# Patient Record
Sex: MALE | Race: WHITE | Employment: UNEMPLOYED | URBAN - METROPOLITAN AREA
[De-identification: names, ages, dates, MRNs, and addresses within clinical notes are randomized per-mention and may not be internally consistent; named-entity substitution may affect disease eponyms.]

---

## 2017-04-18 ENCOUNTER — APPOINTMENT (OUTPATIENT)
Dept: ULTRASOUND IMAGING | Facility: CLINIC | Age: 52
End: 2017-04-18
Attending: PHYSICIAN ASSISTANT

## 2017-04-18 ENCOUNTER — HOSPITAL ENCOUNTER (EMERGENCY)
Facility: CLINIC | Age: 52
Discharge: HOME | End: 2017-04-18
Attending: PHYSICIAN ASSISTANT | Admitting: PHYSICIAN ASSISTANT

## 2017-04-18 ENCOUNTER — APPOINTMENT (OUTPATIENT)
Dept: GENERAL RADIOLOGY | Facility: CLINIC | Age: 52
End: 2017-04-18
Attending: PHYSICIAN ASSISTANT

## 2017-04-18 VITALS
DIASTOLIC BLOOD PRESSURE: 98 MMHG | WEIGHT: 315 LBS | SYSTOLIC BLOOD PRESSURE: 152 MMHG | TEMPERATURE: 97.3 F | RESPIRATION RATE: 16 BRPM | HEART RATE: 82 BPM

## 2017-04-18 DIAGNOSIS — M79.605 PAIN OF LEFT LOWER EXTREMITY DUE TO INJURY: ICD-10-CM

## 2017-04-18 PROCEDURE — 99284 EMERGENCY DEPT VISIT MOD MDM: CPT | Performed by: PHYSICIAN ASSISTANT

## 2017-04-18 PROCEDURE — 73630 X-RAY EXAM OF FOOT: CPT | Mod: LT

## 2017-04-18 PROCEDURE — 93971 EXTREMITY STUDY: CPT | Mod: LT

## 2017-04-18 PROCEDURE — 73590 X-RAY EXAM OF LOWER LEG: CPT | Mod: LT

## 2017-04-18 PROCEDURE — 99285 EMERGENCY DEPT VISIT HI MDM: CPT | Mod: 25

## 2017-04-18 RX ORDER — CEPHALEXIN 500 MG/1
500 CAPSULE ORAL 4 TIMES DAILY
Qty: 28 CAPSULE | Refills: 0 | Status: SHIPPED | OUTPATIENT
Start: 2017-04-18 | End: 2017-04-25

## 2017-04-18 NOTE — ED PROVIDER NOTES
History     Chief Complaint   Patient presents with     Fall     rt lower leg pain after fall 6 days ago     HPI  Ozzie Mathew is a 52 year old male who presents to the emergency department with concerns over left lower leg pain after injury 7 days prior to arrival.  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 He was at a job site entering the house when he slipped on a wooden board that was on the ground.  He is unsure exactly how he landed however states he did sustain an abrasion to his anterior proximal left shin.  He additionally noted left lower leg, swelling ecchymosis and some decreased sensation however denies any paresthesias or true sensation loss.  He also admits to some erythema in the leg once prompted.  He states that he was able to bear weight normally initially however as swelling has somewhat improved he has noted worsening of his pain especially when attempting to stand after long periods of rest.  He has attempted to treat with aspirin however infrequently.  No consistent over the counter treatments.  He denies any prior history of chronic left lower leg pain or significant trauma.  Patient does have a prior history of asthma however he denies any dyspnea, wheezing at this time.  He also denies any fevers, chills, generalized myalgias or other areas of pain as a result of fall besides left leg.      No past medical history on file.  Current Outpatient Prescriptions   Medication Sig Dispense Refill     cephALEXin (KEFLEX) 500 MG capsule Take 1 capsule (500 mg) by mouth 4 times daily for 7 days 28 capsule 0     Social History   Substance Use Topics     Smoking status: Not on file     Smokeless tobacco: Not on file     Alcohol use Not on file     I have reviewed the Medications, Allergies, Past Medical and Surgical History, and Social History in the Epic system.    Review of Systems  CONSTITUTIONAL:NEGATIVE for fever, chills, change in weight  INTEGUMENTARY/SKIN:  POSITIVE for ecchymosis, abrasion, erythema   RESP:NEGATIVE for significant cough or SOB  MUSCULOSKELETAL: POSITIVE  for left leg pain swelling and NEGATIVE for other significant arthralgias or myalgias   NEURO: POSITIVE for mildly decreased sensation to the left lower leg and NEGATIVE for other significant arthralgias or myalgias  Physical Exam   BP (!) 157/112  Temp 97.3  F (36.3  C) (Oral)  Resp 16  Wt (!) 149.7 kg (330 lb)  Physical Exam   Constitutional: He is oriented to person, place, and time. He appears well-developed and well-nourished. He appears distressed (mild patient appears uncomfortable).   Cardiovascular: Normal rate, regular rhythm and normal heart sounds.  Exam reveals no gallop and no friction rub.    No murmur heard.  Dorsalis pedis and posterior tibialis pulses are difficult to palpate bilaterally, however are present on doppler.     Pulmonary/Chest: Effort normal. No respiratory distress. He has wheezes (right lung base). He has no rales. He exhibits no tenderness.   Musculoskeletal:        Left hip: Normal.        Left knee: He exhibits ecchymosis. He exhibits normal range of motion, no swelling, no effusion, no LCL laxity and no MCL laxity. Tenderness found. MCL tenderness noted.        Left ankle: He exhibits swelling and ecchymosis. He exhibits normal range of motion, no deformity, no laceration and normal pulse. No tenderness. Achilles tendon normal.        Left upper leg: He exhibits tenderness. He exhibits no bony tenderness, no swelling, no edema, no deformity and no laceration.        Left lower leg: He exhibits tenderness, bony tenderness, swelling and edema. He exhibits no deformity and no laceration.        Left foot: There is tenderness and swelling. There is normal range of motion, normal capillary refill, no crepitus, no deformity and no laceration.   Neurological: He is alert and oriented to person, place, and time.   Sensation to light touch of left lower extremity is  grossly intact.     Skin: Skin is warm and dry. Abrasion (old proximal left lower leg, scabbed over well healing) and ecchymosis (diffuse of on left lower extermity exnding from mid medial thigh to to heel) noted. No laceration and no rash noted. There is erythema (anterior left lower leg with increased warmth to palpation ).     ED Course     ED Course     Procedures        Critical Care time:  none            Labs Ordered and Resulted from Time of ED Arrival Up to the Time of Departure from the ED - No data to display    Results for orders placed or performed during the hospital encounter of 04/18/17   Tib/Fib XR, left    Narrative    XR TIBIA & FIBULA LT 2 VW, XR FOOT LT G/E 3 VW  4/18/2017 12:28  PM    HISTORY:  pain and swelling of lower leg after a fall at a  construction site a week ago.    COMPARISON:  None.      Impression    IMPRESSION:      Tibia-fibula: No acute process. Vascular calcifications in the  anterior soft tissues.     Foot: No acute process. Small inferior calcaneal spur.    MARVA TUCKER MD   Foot XR, G/E 3 views, left    Narrative    XR TIBIA & FIBULA LT 2 VW, XR FOOT LT G/E 3 VW  4/18/2017 12:28  PM    HISTORY:  pain and swelling of lower leg after a fall at a  construction site a week ago.    COMPARISON:  None.      Impression    IMPRESSION:      Tibia-fibula: No acute process. Vascular calcifications in the  anterior soft tissues.     Foot: No acute process. Small inferior calcaneal spur.    MARVA TUCKER MD   US Lower Extremity Venous Duplex Left    Narrative    US LOWER EXTREMITY VENOUS DUPLEX LEFT 4/18/2017 12:12 PM    HISTORY: Trauma to calf region.    TECHNIQUE: Color flow and doppler spectral waveform analysis of deep  venous structures is performed.  Imaged deep venous structures of the  left lower extremity include the left common femoral vein, femoral  vein, popliteal vein, and visualized posterior deep calf veins.    COMPARISON: None.    FINDINGS: No DVT is demonstrated. There  is a thin elliptical shaped  collection in the proximal calf that measures 5.1 x 4.9 x 0.6 cm  thickness. This is deep to the area of patient's bruising on the skin.      Impression    IMPRESSION:  1. Negative left lower extremity venous Doppler for DVT.  2. Thin liquefied hematoma/seroma deep to the area bruising in calf..     BRANDON YOUNG MD     Assessments & Plan (with Medical Decision Making)     I have reviewed the nursing notes.    I have reviewed the findings, diagnosis, plan and need for follow up with the patient.    Discharge Medication List as of 4/18/2017 12:59 PM      START taking these medications    Details   cephALEXin (KEFLEX) 500 MG capsule Take 1 capsule (500 mg) by mouth 4 times daily for 7 days, Disp-28 capsule, R-0, Local Print           Final diagnoses:   Pain of left lower extremity due to injury     50-year-old male presents to the emergency department with concerns over left lower leg pain, swelling after sustaining injury 1 week ago when he slipped and fell while performing work for which he is self-employed.  He was noted to be hypertensive on arrival, remainder of vital signs within normal limits.  Incidentally a nurse did document that patient felt unsafe in his home however when I asked patient about this he laughed and denied any concerns.  Physical exam findings were significant for diffuse ecchymosis of the left lower extremity from the medial mid thigh to the heel.  There was significant edema of his foot and left lower leg to the proximal tibia.  Old well healing abrasion to the proximal anterior left lower leg and some erythema with increased warmth on the mid anterior leg.  As part of evaluation patient did have x-ray of the tib-fib and foot which were negative for acute fracture, dislocation.  Given degree of edema and erythema I also did elect to obtain an ultrasound of the lower which was negative for acute DVT.  There was a thin liquefied hematoma/seroma deep in the area of  bruising in the proximal calf per radiology report.  History and physical exam is significant for draining hematoma.  Given presence of erythema however I do have some concern for possible developing secondary infection/cellulitis.  Patient was therefore discharged home with the prescription for Keflex.  He was instructed to continue over-the-counter symptomatic treatment as needed with Tylenol/ibuprofen for pain.  Elevate the extremity.  Limited activity as tolerated by discomfort.  Follow-up with primary care provider if no improvement of symptoms within the next 5-7 days.  Worrisome reasons to return to the ER sooner discussed.    Disclaimer: This note consists of symbols derived from keyboarding, dictation, and/or voice recognition software. As a result, there may be errors in the script that have gone undetected.  Please consider this when interpreting information found in the chart.    4/18/2017   [facility] EMERGENCY DEPARTMENT     Radhika Soler PA-C  04/18/17 2128

## 2017-04-18 NOTE — ED AVS SNAPSHOT
[facility] Emergency Department    [address] [address]    Phone:  [phone]    Fax:  [phone]                                       Ozzie Mathew   MRN: [id number]    Department:  [facility] Emergency Department   Date of Visit:  4/18/2017           After Visit Summary Signature Page     I have received my discharge instructions, and my questions have been answered. I have discussed any challenges I see with this plan with the nurse or doctor.    ..........................................................................................................................................  Patient/Patient Representative Signature      ..........................................................................................................................................  Patient Representative Print Name and Relationship to Patient    ..................................................               ................................................  Date                                            Time    ..........................................................................................................................................  Reviewed by Signature/Title    ...................................................              ..............................................  Date                                                            Time

## 2017-04-18 NOTE — ED AVS SNAPSHOT
[facility] Emergency Department    [address] [address]    Phone:  [phone]    Fax:  [phone]                                       Ozzie Mathew   MRN: [id number]    Department:  [facility] Emergency Department   Date of Visit:  4/18/2017           Patient Information     Date Of Birth          1965        Your diagnoses for this visit were:     Pain of left lower extremity due to injury        You were seen by Radhika Soler PA-C.      Follow-up Information     Follow up with No Ref-Primary, Physician In 3 days.    Why:  if no improvement or sooner if new or worsening symptoms       24 Hour Appointment Hotline       To make an appointment at any [facility] clinic, call [phone] ([phone]). If you don't have a family doctor or clinic, we will help you find one. [facility] clinics are conveniently located to serve the needs of you and your family.             Review of your medicines      START taking        Dose / Directions Last dose taken    cephALEXin 500 MG capsule   Commonly known as:  KEFLEX   Dose:  500 mg   Quantity:  28 capsule        Take 1 capsule (500 mg) by mouth 4 times daily for 7 days   Refills:  0                Prescriptions were sent or printed at these locations (1 Prescription)                   Other Prescriptions                Printed at Department/Unit printer (1 of 1)         cephALEXin (KEFLEX) 500 MG capsule                Procedures and tests performed during your visit     Foot XR, G/E 3 views, left    Tib/Fib XR, left    US Lower Extremity Venous Duplex Left      Orders Needing Specimen Collection     None      Pending Results     No orders found from 4/16/2017 to 4/19/2017.            Pending Culture Results     No orders found from 4/16/2017 to 4/19/2017.            Test Results From Your Hospital Stay        4/18/2017 12:43 PM      Narrative     XR TIBIA & FIBULA LT 2 VW, XR FOOT LT G/E 3 VW  4/18/2017 12:28  PM    HISTORY:  pain  and swelling of lower leg after a fall at a  construction site a week ago.    COMPARISON:  None.        Impression     IMPRESSION:      Tibia-fibula: No acute process. Vascular calcifications in the  anterior soft tissues.     Foot: No acute process. Small inferior calcaneal spur.    MARVA TUCKER MD         4/18/2017 12:43 PM      Narrative     XR TIBIA & FIBULA LT 2 VW, XR FOOT LT G/E 3 VW  4/18/2017 12:28  PM    HISTORY:  pain and swelling of lower leg after a fall at a  construction site a week ago.    COMPARISON:  None.        Impression     IMPRESSION:      Tibia-fibula: No acute process. Vascular calcifications in the  anterior soft tissues.     Foot: No acute process. Small inferior calcaneal spur.    MARVA TUCKER MD         4/18/2017 12:16 PM      Narrative     US LOWER EXTREMITY VENOUS DUPLEX LEFT 4/18/2017 12:12 PM    HISTORY: Trauma to calf region.    TECHNIQUE: Color flow and doppler spectral waveform analysis of deep  venous structures is performed.  Imaged deep venous structures of the  left lower extremity include the left common femoral vein, femoral  vein, popliteal vein, and visualized posterior deep calf veins.    COMPARISON: None.    FINDINGS: No DVT is demonstrated. There is a thin elliptical shaped  collection in the proximal calf that measures 5.1 x 4.9 x 0.6 cm  thickness. This is deep to the area of patient's bruising on the skin.        Impression     IMPRESSION:  1. Negative left lower extremity venous Doppler for DVT.  2. Thin liquefied hematoma/seroma deep to the area bruising in calf..     BRANDON YOUNG MD                Thank you for choosing [facility]       Thank you for choosing [facility] for your care. Our goal is always to provide you with excellent care. Hearing back from our patients is one way we can continue to improve our services. Please take a few minutes to complete the written survey that you may receive in the mail after you visit with us. Thank you!        Cindy  "Information     [facility] lets you send messages to your doctor, view your test results, renew your prescriptions, schedule appointments and more. To sign up, go to [email or web address] . Click on \"Log in\" on the left side of the screen, which will take you to the Welcome page. Then click on \"Sign up Now\" on the right side of the page.     You will be asked to enter the access code listed below, as well as some personal information. Please follow the directions to create your username and password.     Your access code is: [email or web address]  Expires: 2017 12:59 PM     Your access code will  in 90 days. If you need help or a new code, please call your [facility] clinic or [phone].        Care EveryWhere ID     This is your Care EveryWhere ID. This could be used by other organizations to access your [facility] medical records  [id number]        After Visit Summary       This is your record. Keep this with you and show to your community pharmacist(s) and doctor(s) at your next visit.                  "

## 2017-04-18 NOTE — ED NOTES
Pt slipped of a plank at work a few days ago, leg is swollen and bruised below the knee, pedal pulses pos with doppler. States having minimal pain except when standing.

## 2018-07-29 ENCOUNTER — HOSPITAL ENCOUNTER (EMERGENCY)
Facility: CLINIC | Age: 53
Discharge: HOME | End: 2018-07-29
Attending: FAMILY MEDICINE | Admitting: FAMILY MEDICINE

## 2018-07-29 ENCOUNTER — APPOINTMENT (OUTPATIENT)
Dept: CT IMAGING | Facility: CLINIC | Age: 53
End: 2018-07-29
Attending: FAMILY MEDICINE

## 2018-07-29 VITALS
OXYGEN SATURATION: 97 % | SYSTOLIC BLOOD PRESSURE: 120 MMHG | BODY MASS INDEX: 46.65 KG/M2 | WEIGHT: 315 LBS | RESPIRATION RATE: 20 BRPM | TEMPERATURE: 98.1 F | DIASTOLIC BLOOD PRESSURE: 88 MMHG | HEIGHT: 69 IN

## 2018-07-29 DIAGNOSIS — S22.41XA CLOSED FRACTURE OF MULTIPLE RIBS OF RIGHT SIDE, INITIAL ENCOUNTER: ICD-10-CM

## 2018-07-29 DIAGNOSIS — S32.009A CLOSED FRACTURE OF TRANSVERSE PROCESS OF LUMBAR VERTEBRA, INITIAL ENCOUNTER (H): ICD-10-CM
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
## 2018-07-29 LAB
ALBUMIN SERPL-MCNC: 3.4 G/DL (ref 3.4–5)
ALP SERPL-CCNC: 111 U/L (ref 40–150)
ALT SERPL W P-5'-P-CCNC: 25 U/L (ref 0–70)
ANION GAP SERPL CALCULATED.3IONS-SCNC: 7 MMOL/L (ref 3–14)
AST SERPL W P-5'-P-CCNC: 35 U/L (ref 0–45)
BASOPHILS # BLD AUTO: 0 10E9/L (ref 0–0.2)
BASOPHILS NFR BLD AUTO: 0.4 %
BILIRUB SERPL-MCNC: 0.8 MG/DL (ref 0.2–1.3)
BUN SERPL-MCNC: 15 MG/DL (ref 7–30)
CALCIUM SERPL-MCNC: 9 MG/DL (ref 8.5–10.1)
CHLORIDE SERPL-SCNC: 104 MMOL/L (ref 94–109)
CO2 SERPL-SCNC: 26 MMOL/L (ref 20–32)
CREAT SERPL-MCNC: 0.72 MG/DL (ref 0.66–1.25)
DIFFERENTIAL METHOD BLD: NORMAL
EOSINOPHIL # BLD AUTO: 0.2 10E9/L (ref 0–0.7)
EOSINOPHIL NFR BLD AUTO: 2.8 %
ERYTHROCYTE [DISTWIDTH] IN BLOOD BY AUTOMATED COUNT: 13.2 % (ref 10–15)
GFR SERPL CREATININE-BSD FRML MDRD: >90 ML/MIN/1.7M2
GLUCOSE SERPL-MCNC: 132 MG/DL (ref 70–99)
HCT VFR BLD AUTO: 46.4 % (ref 40–53)
HGB BLD-MCNC: 15.5 G/DL (ref 13.3–17.7)
IMM GRANULOCYTES # BLD: 0 10E9/L (ref 0–0.4)
IMM GRANULOCYTES NFR BLD: 0.4 %
LIPASE SERPL-CCNC: 101 U/L (ref 73–393)
LYMPHOCYTES # BLD AUTO: 1.6 10E9/L (ref 0.8–5.3)
LYMPHOCYTES NFR BLD AUTO: 18.9 %
MCH RBC QN AUTO: 30 PG (ref 26.5–33)
MCHC RBC AUTO-ENTMCNC: 33.4 G/DL (ref 31.5–36.5)
MCV RBC AUTO: 90 FL (ref 78–100)
MONOCYTES # BLD AUTO: 0.7 10E9/L (ref 0–1.3)
MONOCYTES NFR BLD AUTO: 8 %
NEUTROPHILS # BLD AUTO: 5.8 10E9/L (ref 1.6–8.3)
NEUTROPHILS NFR BLD AUTO: 69.5 %
NRBC # BLD AUTO: 0 10*3/UL
NRBC BLD AUTO-RTO: 0 /100
PLATELET # BLD AUTO: 338 10E9/L (ref 150–450)
POTASSIUM SERPL-SCNC: 4.8 MMOL/L (ref 3.4–5.3)
PROT SERPL-MCNC: 8.2 G/DL (ref 6.8–8.8)
RBC # BLD AUTO: 5.16 10E12/L (ref 4.4–5.9)
SODIUM SERPL-SCNC: 137 MMOL/L (ref 133–144)
WBC # BLD AUTO: 8.3 10E9/L (ref 4–11)

## 2018-07-29 PROCEDURE — 83690 ASSAY OF LIPASE: CPT | Performed by: FAMILY MEDICINE

## 2018-07-29 PROCEDURE — 25000125 ZZHC RX 250: Performed by: FAMILY MEDICINE

## 2018-07-29 PROCEDURE — 99285 EMERGENCY DEPT VISIT HI MDM: CPT | Mod: Z6 | Performed by: FAMILY MEDICINE

## 2018-07-29 PROCEDURE — 96374 THER/PROPH/DIAG INJ IV PUSH: CPT | Performed by: FAMILY MEDICINE

## 2018-07-29 PROCEDURE — 80053 COMPREHEN METABOLIC PANEL: CPT | Performed by: FAMILY MEDICINE

## 2018-07-29 PROCEDURE — 25000128 H RX IP 250 OP 636: Performed by: FAMILY MEDICINE

## 2018-07-29 PROCEDURE — 71260 CT THORAX DX C+: CPT

## 2018-07-29 PROCEDURE — 96361 HYDRATE IV INFUSION ADD-ON: CPT | Performed by: FAMILY MEDICINE

## 2018-07-29 PROCEDURE — 99285 EMERGENCY DEPT VISIT HI MDM: CPT | Mod: 25 | Performed by: FAMILY MEDICINE

## 2018-07-29 PROCEDURE — 85025 COMPLETE CBC W/AUTO DIFF WBC: CPT | Performed by: FAMILY MEDICINE

## 2018-07-29 PROCEDURE — 96375 TX/PRO/DX INJ NEW DRUG ADDON: CPT | Performed by: FAMILY MEDICINE

## 2018-07-29 RX ORDER — OXYCODONE AND ACETAMINOPHEN 5; 325 MG/1; MG/1
1 TABLET ORAL EVERY 6 HOURS PRN
COMMUNITY

## 2018-07-29 RX ORDER — HYDROMORPHONE HYDROCHLORIDE 2 MG/1
2 TABLET ORAL EVERY 6 HOURS PRN
Qty: 10 TABLET | Refills: 0 | Status: SHIPPED | OUTPATIENT
Start: 2018-07-29

## 2018-07-29 RX ORDER — HYDROMORPHONE HYDROCHLORIDE 1 MG/ML
0.5 INJECTION, SOLUTION INTRAMUSCULAR; INTRAVENOUS; SUBCUTANEOUS
Status: DISCONTINUED | OUTPATIENT
Start: 2018-07-29 | End: 2018-07-29 | Stop reason: HOSPADM

## 2018-07-29 RX ORDER — ONDANSETRON 2 MG/ML
4 INJECTION INTRAMUSCULAR; INTRAVENOUS ONCE
Status: COMPLETED | OUTPATIENT
Start: 2018-07-29 | End: 2018-07-29

## 2018-07-29 RX ORDER — IOPAMIDOL 755 MG/ML
100 INJECTION, SOLUTION INTRAVASCULAR ONCE
Status: COMPLETED | OUTPATIENT
Start: 2018-07-29 | End: 2018-07-29

## 2018-07-29 RX ORDER — DICLOFENAC SODIUM 75 MG/1
75 TABLET, DELAYED RELEASE ORAL 2 TIMES DAILY PRN
Qty: 30 TABLET | Refills: 0 | Status: SHIPPED | OUTPATIENT
Start: 2018-07-29

## 2018-07-29 RX ADMIN — Medication 0.5 MG: at 14:32

## 2018-07-29 RX ADMIN — IOPAMIDOL 100 ML: 755 INJECTION, SOLUTION INTRAVENOUS at 15:15

## 2018-07-29 RX ADMIN — ONDANSETRON 4 MG: 2 INJECTION INTRAMUSCULAR; INTRAVENOUS at 14:33

## 2018-07-29 RX ADMIN — SODIUM CHLORIDE 74 ML: 9 INJECTION, SOLUTION INTRAVENOUS at 15:15

## 2018-07-29 RX ADMIN — SODIUM CHLORIDE 1000 ML: 9 INJECTION, SOLUTION INTRAVENOUS at 14:32

## 2018-07-29 NOTE — ED PROVIDER NOTES
History     Chief Complaint   Patient presents with     Chest Pain     right side chest pain     Fall     fell 5 days ago 5 ft off ladder  landed on back, 1 hour ago coughed, sudden increase in pain     HPI  Ozzie Mathew is a 53 year old male, past medical history is unremarkable, presents the emergency department with concerns of sudden worsening of right-sided chest pain that began after a fall off of a ladder approximately 5 days prior to presentation.  After coughing approximately an hour prior to presentation he had a sudden dramatic increase in the amount of pain prompting the emergency room visit.  History is obtained from the patient who is very vague and difficult historian but states that he was [place] friend[place] cabin about 5 days ago and about 3 steps up a ladder when he fell off backwards onto his back landing on a caulking gun near his right periscapular area.  It took him a while to come to his senses as he puts it and get up off the ground.  He has been able to tolerate pain with use of someone else's Percocet.  Today while eating 2 hamburgers and a chocolate shake he coughed and the pain escalated dramatically to where Percocet taken approximately an hour prior to presentation has not relieved his pain significantly.  He denies shortness of breath but it definitely is difficult to take a deep breath due to pain in the area described in the right periscapular/intrascapular area as well as the right lateral chest abdomen area.  He notes no nausea or vomiting.  No previous trauma.      Problem List:    There are no active problems to display for this patient.       Past Medical History:    No past medical history on file.    Past Surgical History:    No past surgical history on file.    Family History:    No family history on file.    Social History:  Marital Status:  Single [1]  Social History   Substance Use Topics     Smoking status: Not on file     Smokeless tobacco: Not on file     Alcohol use Not  "on file        Medications:      oxyCODONE-acetaminophen (PERCOCET) 5-325 MG per tablet         Review of Systems   All other systems reviewed and are negative.      Physical Exam   BP: (!) 142/96  Heart Rate: 73  Temp: 98.1  F (36.7  C)  Resp: 20  Height: 175.3 cm (5' 9\")  Weight: 147.1 kg (324 lb 5 oz)  SpO2: 97 %      Physical Exam   Constitutional: He is oriented to person, place, and time. He appears well-developed and well-nourished.   Alert, clearly uncomfortable due to pain, splinting chest wall.     HENT:   Head: Normocephalic and atraumatic.   Right Ear: External ear normal.   Left Ear: External ear normal.   Nose: Nose normal.   Mouth/Throat: Oropharynx is clear and moist.   Eyes: Conjunctivae and EOM are normal. Pupils are equal, round, and reactive to light.   Neck: Normal range of motion. Neck supple.   Cardiovascular: Normal rate, regular rhythm, normal heart sounds and intact distal pulses.    Pulmonary/Chest: Effort normal.               Poor inspiratory effort.  No clinical pneumothorax.  There is tenderness as diagrammed throughout the chest wall and upper right lateral abdomen   Abdominal:       Obese abdomen tenderness as diagrammed active bowel sounds   Musculoskeletal: Normal range of motion.   Neurological: He is alert and oriented to person, place, and time.   Skin: Skin is warm and dry.   Psychiatric: He has a normal mood and affect. His behavior is normal.   Nursing note and vitals reviewed.      ED Course     ED Course     Procedures               Critical Care time:  none               Results for orders placed or performed during the hospital encounter of 07/29/18 (from the past 24 hour(s))   CBC with platelets differential   Result Value Ref Range    WBC 8.3 4.0 - 11.0 10e9/L    RBC Count 5.16 4.4 - 5.9 10e12/L    Hemoglobin 15.5 13.3 - 17.7 g/dL    Hematocrit 46.4 40.0 - 53.0 %    MCV 90 78 - 100 fl    MCH 30.0 26.5 - 33.0 pg    MCHC 33.4 31.5 - 36.5 g/dL    RDW 13.2 10.0 - 15.0 %    " Platelet Count 338 150 - 450 10e9/L    Diff Method Automated Method     % Neutrophils 69.5 %    % Lymphocytes 18.9 %    % Monocytes 8.0 %    % Eosinophils 2.8 %    % Basophils 0.4 %    % Immature Granulocytes 0.4 %    Nucleated RBCs 0 0 /100    Absolute Neutrophil 5.8 1.6 - 8.3 10e9/L    Absolute Lymphocytes 1.6 0.8 - 5.3 10e9/L    Absolute Monocytes 0.7 0.0 - 1.3 10e9/L    Absolute Eosinophils 0.2 0.0 - 0.7 10e9/L    Absolute Basophils 0.0 0.0 - 0.2 10e9/L    Abs Immature Granulocytes 0.0 0 - 0.4 10e9/L    Absolute Nucleated RBC 0.0    Comprehensive metabolic panel   Result Value Ref Range    Sodium 137 133 - 144 mmol/L    Potassium 4.8 3.4 - 5.3 mmol/L    Chloride 104 94 - 109 mmol/L    Carbon Dioxide 26 20 - 32 mmol/L    Anion Gap 7 3 - 14 mmol/L    Glucose 132 (H) 70 - 99 mg/dL    Urea Nitrogen 15 7 - 30 mg/dL    Creatinine 0.72 0.66 - 1.25 mg/dL    GFR Estimate >90 >60 mL/min/1.7m2    GFR Estimate If Black >90 >60 mL/min/1.7m2    Calcium 9.0 8.5 - 10.1 mg/dL    Bilirubin Total 0.8 0.2 - 1.3 mg/dL    Albumin 3.4 3.4 - 5.0 g/dL    Protein Total 8.2 6.8 - 8.8 g/dL    Alkaline Phosphatase 111 40 - 150 U/L    ALT 25 0 - 70 U/L    AST 35 0 - 45 U/L   Lipase   Result Value Ref Range    Lipase 101 73 - 393 U/L   CT Chest/Abdomen/Pelvis w Contrast    Narrative    CT CHEST, ABDOMEN, PELVIS WITH CONTRAST   7/29/2018 3:27 PM     HISTORY: Fall off ladder.    TECHNIQUE: 100 mL Isovue 370 IV were administered. After contrast  administration, volumetric helical sections were acquired from the  thoracic inlet to the ischial tuberosities. Coronal images were also  reconstructed. Radiation dose for this scan was reduced using  automated exposure control, adjustment of the mA and/or kV according  to patient size, or iterative reconstruction technique.    COMPARISON: None.    FINDINGS:    Chest: No pneumothorax. Mild atelectasis is noted at both lung bases.  Small calcified granuloma in the right middle lobe. Trace amount  of  pleural fluid is noted on the right. No pericardial or left pleural  effusion. Atherosclerotic calcification of the thoracic aorta and  coronary arteries. No acute mediastinal abnormality is identified.  Calcified right hilar lymph nodes are noted. There are mildly  displaced fractures of the right eighth and ninth ribs  posterolaterally. There are also nondisplaced fractures of the right  sixth and seventh ribs laterally. The right seventh rib also likely  contains nondisplaced fractures anteriorly as well as posteriorly and  medially.    Abdomen and Pelvis: The liver, gallbladder, spleen, adrenal glands,  pancreas, and kidneys are unremarkable. No evidence for solid organ  injury. No bowel obstruction. No free fluid in the abdomen or pelvis.  No intra-abdominal fluid collection to suggest hematoma. There is a  small fat-containing paraumbilical hernia. There is a nondisplaced  fracture of the right L2 transverse process. Degenerative changes are  noted in both hips and in the thoracolumbar spine.      Impression    IMPRESSION:   1. There are displaced fractures of the right eighth and ninth ribs  posterolaterally, as well as nondisplaced fractures of the right sixth  and seventh ribs laterally.   2. There is a nondisplaced fracture right L2 transverse process.  3. Trace right pleural effusion.  4. Small fat-containing paraumbilical hernia.         Medications   HYDROmorphone (PF) (DILAUDID) injection 0.5 mg (0.5 mg Intravenous Given 7/29/18 1432)   0.9% sodium chloride BOLUS (0 mLs Intravenous Stopped 7/29/18 1606)   ondansetron (ZOFRAN) injection 4 mg (4 mg Intravenous Given 7/29/18 1433)   iopamidol (ISOVUE-370) solution 100 mL (100 mLs Intravenous Given 7/29/18 1515)   sodium chloride 0.9 % bag 500mL for CT scan flush use (74 mLs Intravenous Given 7/29/18 1515)   4:04 PM  I discussed the imaging studies with the patient.  I also informed him I would discuss the L2 transverse process fracture specifically  with surgery/spine surgery.  I spoke with  the on-call neurosurgeon for at [facility] with respect to the spinous injury and she reassured me that this was not something that needed any immobilization or intervention whatsoever.  Pain control.      Assessments & Plan (with Medical Decision Making)   53-year-old male past medical history reviewed as above who presents for evaluation of chest pain after a recent episode of coughing in the context of a significant fall from a ladder onto his back 5 days prior to presentation as discussed in the HPI.  Physical exam finds multiple areas of tenderness as diagrammed.  The patient is vitally stable and uncomfortable and he was medicated to his satisfaction with IV Dilaudid in the emergency department.  Imaging was obtained to address the areas of trauma which included CT of the chest abdomen pelvis.  This demonstrated displaced fractures of the right eighth and ninth ribs posterolaterally as well as nondisplaced fractures of the right sixth and seventh ribs laterally and a nondisplaced fracture of the right L2 transverse process.  The spinous injury was discussed with on-call neurosurgery at the time stamp above recommendations for pain control only.  I discussed possible options for disposition with the patient which would include admission to hospital for pain control with intravenous medications as necessary versus disposition home with oral medication.  The patient did not want to be admitted to hospital and will be disposition home on his own recognizance with oral Dilaudid as well as NSAID therapy recommended in the form of diclofenac 75 mg twice daily.  He is warned to anticipate a recovery course with the fractures around 4-6 weeks.  Return to the emergency department if worse or changes otherwise to follow-up in clinic with his primary care provider.    Disclaimer: This note consists of symbols derived from keyboarding, dictation  and/or voice recognition software. As a result, there may be errors in the script that have gone undetected. Please consider this when interpreting information found in this chart.      I have reviewed the nursing notes.    I have reviewed the findings, diagnosis, plan and need for follow up with the patient.       New Prescriptions    No medications on file       Final diagnoses:   Fall, initial encounter   Closed fracture of multiple ribs of right side, initial encounter - Closed fractures of right 8 and 9 posterolaterally and 6 and 7 laterally.   Closed fracture of transverse process of lumbar vertebra, initial encounter (H) - L2       7/29/2018   [facility] EMERGENCY DEPARTMENT     Boaz Chen MD  07/29/18 6490

## 2018-07-29 NOTE — ED AVS SNAPSHOT
[facility] Emergency Department    [address] [address]    Phone:  [phone]    Fax:  [phone]                                       Ozzie Mathew   MRN: [id number]    Department:  [facility] Emergency Department   Date of Visit:  7/29/2018           Patient Information     Date Of Birth          1965        Your diagnoses for this visit were:     Fall, initial encounter     Closed fracture of multiple ribs of right side, initial encounter Closed fractures of right 8 and 9 posterolaterally and 6 and 7 laterally.    Closed fracture of transverse process of lumbar vertebra, initial encounter (H) L2       You were seen by Boaz Chen MD.      Follow-up Information     Schedule an appointment as soon as possible for a visit with No Ref-Primary, Physician.    Why:  As needed, If symptoms worsen        Discharge Instructions       Avoidance of further trauma.  Diclofenac 75 mg twice daily with food.  Dilaudid for breakthrough pain as directed.  Return to the emergency department if worse or changes.  Follow-up in clinic with primary care provider.    24 Hour Appointment Hotline       To make an appointment at any [facility] clinic, call [phone] ([phone]). If you don't have a family doctor or clinic, we will help you find one. [facility] clinics are conveniently located to serve the needs of you and your family.             Review of your medicines      START taking        Dose / Directions Last dose taken    diclofenac 75 MG EC tablet   Commonly known as:  VOLTAREN   Dose:  75 mg   Quantity:  30 tablet        Take 1 tablet (75 mg) by mouth 2 times daily as needed for moderate pain   Refills:  0        HYDROmorphone 2 MG tablet   Commonly known as:  DILAUDID   Dose:  2 mg   Quantity:  10 tablet        Take 1 tablet (2 mg) by mouth every 6 hours as needed for pain   Refills:  0          Our records show that you are taking the medicines listed below. If these  are incorrect, please call your family doctor or clinic.        Dose / Directions Last dose taken    oxyCODONE-acetaminophen 5-325 MG per tablet   Commonly known as:  PERCOCET   Dose:  1 tablet        Take 1 tablet by mouth every 6 hours as needed for pain   Refills:  0                Information about OPIOIDS     PRESCRIPTION OPIOIDS: WHAT YOU NEED TO KNOW   We gave you an opioid (narcotic) pain medicine. It is important to manage your pain, but opioids are not always the best choice. You should first try all the other options your care team gave you. Take this medicine for as short a time (and as few doses) as possible.     These medicines have risks:    DO NOT drive when on new or higher doses of pain medicine. These medicines can affect your alertness and reaction times, and you could be arrested for driving under the influence (DUI). If you need to use opioids long-term, talk to your care team about driving.    DO NOT operate heave machinery    DO NOT do any other dangerous activities while taking these medicines.     DO NOT drink any alcohol while taking these medicines.      If the opioid prescribed includes acetaminophen, DO NOT take with any other medicines that contain acetaminophen. Read all labels carefully. Look for the word  acetaminophen  or  Tylenol.  Ask your pharmacist if you have questions or are unsure.    You can get addicted to pain medicines, especially if you have a history of addiction (chemical, alcohol or substance dependence). Talk to your care team about ways to reduce this risk.    Store your pills in a secure place, locked if possible. We will not replace any lost or stolen medicine. If you don t finish your medicine, please throw away (dispose) as directed by your pharmacist. The [place] [employer]ution [employer] has more information about safe disposal: https://www.pca.[facility].[place]/living-green/managing-unwanted-medications.     All opioids tend to cause constipation. Drink plenty of  water and eat foods that have a lot of fiber, such as fruits, vegetables, prune juice, apple juice and high-fiber cereal. Take a laxative (Miralax, milk of magnesia, Colace, Senna) if you don t move your bowels at least every other day.         Prescriptions were sent or printed at these locations (2 Prescriptions)                   Other Prescriptions                Printed at Department/Unit printer (2 of 2)         diclofenac (VOLTAREN) 75 MG EC tablet               HYDROmorphone (DILAUDID) 2 MG tablet                Procedures and tests performed during your visit     CBC with platelets differential    CT Chest/Abdomen/Pelvis w Contrast    Comprehensive metabolic panel    Lipase      Orders Needing Specimen Collection     None      Pending Results     Date and Time Order Name Status Description    7/29/2018 1420 CT Chest/Abdomen/Pelvis w Contrast Preliminary             Pending Culture Results     No orders found from 7/27/2018 to 7/30/2018.            Pending Results Instructions     If you had any lab results that were not finalized at the time of your Discharge, you can call the ED Lab Result RN at [phone]. You will be contacted by this team for any positive Lab results or changes in treatment. The nurses are available 7 days a week from 10A to 6:30P.  You can leave a message 24 hours per day and they will return your call.        Test Results From Your Hospital Stay        7/29/2018  2:22 PM      Component Results     Component Value Ref Range & Units Status    WBC 8.3 4.0 - 11.0 10e9/L Final    RBC Count 5.16 4.4 - 5.9 10e12/L Final    Hemoglobin 15.5 13.3 - 17.7 g/dL Final    Hematocrit 46.4 40.0 - 53.0 % Final    MCV 90 78 - 100 fl Final    MCH 30.0 26.5 - 33.0 pg Final    MCHC 33.4 31.5 - 36.5 g/dL Final    RDW 13.2 10.0 - 15.0 % Final    Platelet Count 338 150 - 450 10e9/L Final    Diff Method Automated Method  Final    % Neutrophils 69.5 % Final    % Lymphocytes 18.9 % Final    % Monocytes 8.0 %  Final    % Eosinophils 2.8 % Final    % Basophils 0.4 % Final    % Immature Granulocytes 0.4 % Final    Nucleated RBCs 0 0 /100 Final    Absolute Neutrophil 5.8 1.6 - 8.3 10e9/L Final    Absolute Lymphocytes 1.6 0.8 - 5.3 10e9/L Final    Absolute Monocytes 0.7 0.0 - 1.3 10e9/L Final    Absolute Eosinophils 0.2 0.0 - 0.7 10e9/L Final    Absolute Basophils 0.0 0.0 - 0.2 10e9/L Final    Abs Immature Granulocytes 0.0 0 - 0.4 10e9/L Final    Absolute Nucleated RBC 0.0  Final         7/29/2018  2:49 PM      Component Results     Component Value Ref Range & Units Status    Sodium 137 133 - 144 mmol/L Final    Potassium 4.8 3.4 - 5.3 mmol/L Final    Specimen slightly hemolyzed, potassium may be falsely elevated    Chloride 104 94 - 109 mmol/L Final    Carbon Dioxide 26 20 - 32 mmol/L Final    Anion Gap 7 3 - 14 mmol/L Final    Glucose 132 (H) 70 - 99 mg/dL Final    Urea Nitrogen 15 7 - 30 mg/dL Final    Creatinine 0.72 0.66 - 1.25 mg/dL Final    GFR Estimate >90 >60 mL/min/1.7m2 Final    Non  GFR Calc    GFR Estimate If Black >90 >60 mL/min/1.7m2 Final    African American GFR Calc    Calcium 9.0 8.5 - 10.1 mg/dL Final    Bilirubin Total 0.8 0.2 - 1.3 mg/dL Final    Albumin 3.4 3.4 - 5.0 g/dL Final    Protein Total 8.2 6.8 - 8.8 g/dL Final    Alkaline Phosphatase 111 40 - 150 U/L Final    ALT 25 0 - 70 U/L Final    AST 35 0 - 45 U/L Final    Specimen is hemolyzed which can falsely elevate AST. Analysis of a   non-hemolyzed specimen may result in a lower value.           7/29/2018  2:49 PM      Component Results     Component Value Ref Range & Units Status    Lipase 101 73 - 393 U/L Final         7/29/2018  3:46 PM      Narrative     CT CHEST, ABDOMEN, PELVIS WITH CONTRAST   7/29/2018 3:27 PM     HISTORY: Fall off ladder.    TECHNIQUE: 100 mL Isovue 370 IV were administered. After contrast  administration, volumetric helical sections were acquired from the  thoracic inlet to the ischial tuberosities. Coronal  images were also  reconstructed. Radiation dose for this scan was reduced using  automated exposure control, adjustment of the mA and/or kV according  to patient size, or iterative reconstruction technique.    COMPARISON: None.    FINDINGS:    Chest: No pneumothorax. Mild atelectasis is noted at both lung bases.  Small calcified granuloma in the right middle lobe. Trace amount of  pleural fluid is noted on the right. No pericardial or left pleural  effusion. Atherosclerotic calcification of the thoracic aorta and  coronary arteries. No acute mediastinal abnormality is identified.  Calcified right hilar lymph nodes are noted. There are mildly  displaced fractures of the right eighth and ninth ribs  posterolaterally. There are also nondisplaced fractures of the right  sixth and seventh ribs laterally. The right seventh rib also likely  contains nondisplaced fractures anteriorly as well as posteriorly and  medially.    Abdomen and Pelvis: The liver, gallbladder, spleen, adrenal glands,  pancreas, and kidneys are unremarkable. No evidence for solid organ  injury. No bowel obstruction. No free fluid in the abdomen or pelvis.  No intra-abdominal fluid collection to suggest hematoma. There is a  small fat-containing paraumbilical hernia. There is a nondisplaced  fracture of the right L2 transverse process. Degenerative changes are  noted in both hips and in the thoracolumbar spine.        Impression     IMPRESSION:   1. There are displaced fractures of the right eighth and ninth ribs  posterolaterally, as well as nondisplaced fractures of the right sixth  and seventh ribs laterally.   2. There is a nondisplaced fracture right L2 transverse process.  3. Trace right pleural effusion.  4. Small fat-containing paraumbilical hernia.                  Thank you for choosing [facility]       Thank you for choosing [facility] for your care. Our goal is always to provide you with excellent care. Hearing back from our patients is one way  "we can continue to improve our services. Please take a few minutes to complete the written survey that you may receive in the mail after you visit with us. Thank you!        [employer] Information     [employer] lets you send messages to your doctor, view your test results, renew your prescriptions, schedule appointments and more. To sign up, go to [email or web address] . Click on \"Log in\" on the left side of the screen, which will take you to the Welcome page. Then click on \"Sign up Now\" on the right side of the page.     You will be asked to enter the access code listed below, as well as some personal information. Please follow the directions to create your username and password.     Your access code is: [email or web address]  Expires: 10/27/2018  4:15 PM     Your access code will  in 90 days. If you need help or a new code, please call your [facility] clinic or [phone].        Care EveryWhere ID     This is your Care EveryWhere ID. This could be used by other organizations to access your [facility] medical records  [id number]        Equal Access to Services     AZIZA RAI : Hadarlin Carter, mahin yoon, mike bustamante, trudy durbin. So [facility] [phone].    ATENCIÓN: Si habla español, tiene a carson disposición servicios gratuitos de asistencia lingüística. Scott al [phone].    We comply with applicable federal civil rights laws and [place] laws. We do not discriminate on the basis of race, color, national origin, age, disability, sex, sexual orientation, or gender identity.            After Visit Summary       This is your record. Keep this with you and show to your community pharmacist(s) and doctor(s) at your next visit.                  "

## 2018-07-29 NOTE — ED AVS SNAPSHOT
[facility] Emergency Department    [address] [address]    Phone:  [phone]    Fax:  [phone]                                       Ozzie Mathew   MRN: [id number]    Department:  [facility] Emergency Department   Date of Visit:  7/29/2018           After Visit Summary Signature Page     I have received my discharge instructions, and my questions have been answered. I have discussed any challenges I see with this plan with the nurse or doctor.    ..........................................................................................................................................  Patient/Patient Representative Signature      ..........................................................................................................................................  Patient Representative Print Name and Relationship to Patient    ..................................................               ................................................  Date                                            Time    ..........................................................................................................................................  Reviewed by Signature/Title    ...................................................              ..............................................  Date                                                            Time

## 2018-07-29 NOTE — DISCHARGE INSTRUCTIONS
Avoidance of further trauma.  Diclofenac 75 mg twice daily with food.  Dilaudid for breakthrough pain as directed.  Return to the emergency department if worse or changes.  Follow-up in clinic with primary care provider.

## 2018-08-03 ENCOUNTER — TELEPHONE (OUTPATIENT)
Dept: FAMILY MEDICINE | Facility: CLINIC | Age: 53
End: 2018-08-03

## 2018-08-03 NOTE — TELEPHONE ENCOUNTER
Reason for Call:  Other medication question    Detailed comments: pt's daughter calling to see if it's ok for pt to take ibuprofen and tylenol now that he is done taking oxycodone-acetaminophen.     Phone Number Patient can be reached at:  Aixa is at [phone] and pt will be with her.    Best Time: any    Can we leave a detailed message on this number? YES    Call taken on 8/3/2018 at 2:25 PM by Edilma Mena

## 2018-08-03 NOTE — TELEPHONE ENCOUNTER
No Consent to Communicate in EPIC, advised daughter.Daughter states to call patient.   Attempted to contact patient x 2, missing or invalid number.   Called daughter back and advised her to have patient call clinic to discuss.   Patient need Office visit.

## 2018-08-07 ENCOUNTER — OFFICE VISIT (OUTPATIENT)
Dept: FAMILY MEDICINE | Facility: CLINIC | Age: 53
End: 2018-08-07

## 2018-08-07 VITALS
RESPIRATION RATE: 14 BRPM | HEIGHT: 69 IN | WEIGHT: 315 LBS | TEMPERATURE: 98.7 F | OXYGEN SATURATION: 96 % | BODY MASS INDEX: 46.65 KG/M2 | DIASTOLIC BLOOD PRESSURE: 90 MMHG | SYSTOLIC BLOOD PRESSURE: 136 MMHG | HEART RATE: 86 BPM

## 2018-08-07 DIAGNOSIS — E66.01 MORBID OBESITY (H): ICD-10-CM

## 2018-08-07 DIAGNOSIS — Z23 NEED FOR VACCINATION: ICD-10-CM

## 2018-08-07 DIAGNOSIS — S22.49XD CLOSED FRACTURE OF MULTIPLE RIBS WITH ROUTINE HEALING, UNSPECIFIED LATERALITY, SUBSEQUENT ENCOUNTER: Primary | ICD-10-CM

## 2018-08-07 DIAGNOSIS — Z12.11 SCREENING FOR MALIGNANT NEOPLASM OF COLON: ICD-10-CM

## 2018-08-07 DIAGNOSIS — S32.009D CLOSED FRACTURE OF TRANSVERSE PROCESS OF LUMBAR VERTEBRA WITH ROUTINE HEALING, SUBSEQUENT ENCOUNTER: ICD-10-CM

## 2018-08-07 DIAGNOSIS — R03.0 ELEVATED BLOOD PRESSURE READING WITHOUT DIAGNOSIS OF HYPERTENSION: ICD-10-CM

## 2018-08-07 PROCEDURE — 90715 TDAP VACCINE 7 YRS/> IM: CPT | Performed by: FAMILY MEDICINE

## 2018-08-07 PROCEDURE — 99214 OFFICE O/P EST MOD 30 MIN: CPT | Mod: 25 | Performed by: FAMILY MEDICINE

## 2018-08-07 PROCEDURE — 90471 IMMUNIZATION ADMIN: CPT | Performed by: FAMILY MEDICINE

## 2018-08-07 RX ORDER — CYCLOBENZAPRINE HCL 10 MG
5-10 TABLET ORAL 3 TIMES DAILY PRN
Qty: 30 TABLET | Refills: 0 | Status: SHIPPED | OUTPATIENT
Start: 2018-08-07

## 2018-08-07 NOTE — MR AVS SNAPSHOT
After Visit Summary   8/7/2018    Ozzie Mathew    MRN: [id number]           Patient Information     Date Of Birth          1965        Visit Information        Provider Department      8/7/2018 11:00 AM Manjinder Hernandez MD [facility]        Today's Diagnoses     Closed fracture of multiple ribs with routine healing, unspecified laterality, subsequent encounter    -  1    Closed fracture of transverse process of lumbar vertebra with routine healing, subsequent encounter        Morbid obesity (H)        Screening for malignant neoplasm of colon        Need for vaccination        Mixed hyperlipidemia          Care Instructions    May continue to take Percocet as needed for severe pain.  May take flexeril 5-10 mg orally 8 hours apart only as needed for muscle spasms.    Ambulate as tolerated.  Avoid heavy lifting until rib fractures have completely healed.  Healing usually takes 6 weeks to complete.    Blood pressure today slightly elevated.  To verify if real elevation, schedule provider visit in 3-4 weeks  If still above 130/80 will need medical treatment.  Low salt, low fat diet.     Turn in your FIT test at your soonest convenience to screen for colon cancer.  If this is positive, colonoscopy will be advised to determine if you do have colon cancer.      Rib Fracture    You broke one or more ribs. This is called a rib fracture. Rib fractures do not require a cast like other bones. They will heal by themselves in about 4 to 6 weeks. The first 3 to 4 weeks will be the most painful because deep breathing, coughing, or changing position from sitting to lying down, may cause the broken ends to move slightly.  Home care    Rest. You should not be doing any heavy lifting or strenuous exertion until the pain goes away.    It hurts to breathe when you have a broken rib. This puts you at risk of getting pneumonia from poor airflow through your lungs. To prevent  this:  ? Take several very deep breaths once an hour while you're awake. Exhale through pursed lips as if you are blowing up a balloon. If possible, actually blow up a balloon or a rubber glove. This exercise builds up pressure inside the lung and prevents collapse of the small air sacs of the lung. This exercise may cause some pain at the site of injury, which is normal.  ? You may have gotten a breathing exercise device called an incentive spirometer. Use it at least 4 times a day, or as directed.    Apply an ice pack over the injured area for 15 to 20 minutes every 1 to 2 hours. You should do this for the first 24 to 48 hours. You can make an ice pack by filling a plastic bag that seals at the top with ice cubes and then wrapping it with a thin towel. Continue with ice packs as needed for the relief of pain and swelling.    You may use over-the-counter pain medicine to control pain, unless another pain medicine was prescribed. If you have chronic liver or kidney disease or ever had a stomach ulcer or GI bleeding, talk with your healthcare provider before using these medicines.    If your pain is not controlled, contact your healthcare provider. Sometimes a stronger pain medicine may be needed. A nerve block can be done in case of severe pain. It will numb the nerve between the ribs.  Follow-up care  Follow up with your healthcare provider, or as advised. Rarely, a broken rib will cause complications within the first few days that may not be evident during your initial exam. This can include collapsed lung, bleeding around the lung or into the abdomen, or pneumonia. Therefore, watch for the signs below.  If X-rays were taken, you will be told of any new findings that may affect your care.  Call 911  Call 911 if you have:    Dizziness, weakness or fainting    Shortness of breath with or without chest discomfort    New or worsening abdominal pain    Discomfort in other areas of your upper body such as your  "shoulders, jaw, neck, or arms  When to seek medical advice  Call your healthcare provider right away if any of these occur:    Increasing chest pain with breathing    Fever of 100.4 F (38 C) or higher, or as directed by your healthcare provider    Congested cough  Date Last Reviewed: 12/3/2015    3764-7845 [employer]. [address] [place] [address]. All rights reserved. This information is not intended as a substitute for professional medical care. Always follow your healthcare professional's instructions.                Follow-ups after your visit        Future tests that were ordered for you today     Open Future Orders        Priority Expected Expires Ordered    Fecal colorectal cancer screen (FIT) Routine 8/28/2018 10/30/2018 8/7/2018            Who to contact     If you have questions or need follow up information about today's clinic visit or your schedule please contact [facility] directly at [phone].  Normal or non-critical lab and imaging results will be communicated to you by [employer], letter or phone within 4 business days after the clinic has received the results. If you do not hear from us within 7 days, please contact the clinic through [employer] or phone. If you have a critical or abnormal lab result, we will notify you by phone as soon as possible.  Submit refill requests through [employer] or call your pharmacy and they will forward the refill request to us. Please allow 3 business days for your refill to be completed.          Additional Information About Your Visit        [employer] Information     [employer] lets you send messages to your doctor, view your test results, renew your prescriptions, schedule appointments and more. To sign up, go to [email or web address] . Click on \"Log in\" on the left side of the screen, which will take you to the Welcome page. Then click on \"Sign up Now\" on the right side of the page.     You will be asked to enter the access code " "listed below, as well as some personal information. Please follow the directions to create your username and password.     Your access code is: [email or web address]  Expires: 10/27/2018  4:15 PM     Your access code will  in 90 days. If you need help or a new code, please call your [facility] clinic or [phone].        Care EveryWhere ID     This is your Care EveryWhere ID. This could be used by other organizations to access your [facility] medical records  [id number]        Your Vitals Were     Pulse Temperature Respirations Height Pulse Oximetry BMI (Body Mass Index)    86 98.7  F (37.1  C) (Tympanic) 14 5' 9\" (1.753 m) 96% 46.81 kg/m2       Blood Pressure from Last 3 Encounters:   18 136/90   18 120/88   17 (!) 152/98    Weight from Last 3 Encounters:   18 317 lb (143.8 kg)   18 324 lb 5 oz (147.1 kg)   17 (!) 330 lb (149.7 kg)              We Performed the Following     1st  Administration  [88071]     TDAP VACCINE (ADACEL) [77084.002]        Primary Care Provider Fax #    Physician No Ref-Primary [phone]       No address on file        Equal Access to Services     WINDY RAI : Hadii ana lilia cortezo [place], waaxda luqadaha, qaybta kaalmada adeegyada, trudy de la cruz . So [facility] [phone].    ATENCIÓN: Si habla español, tiene a carson disposición servicios gratuitos de asistencia lingüística. Llame al [phone].    We comply with applicable federal civil rights laws and [place] laws. We do not discriminate on the basis of race, color, national origin, age, disability, sex, sexual orientation, or gender identity.            Thank you!     Thank you for choosing [facility]  for your care. Our goal is always to provide you with excellent care. Hearing back from our patients is one way we can continue to improve our services. Please take a few minutes to complete the written survey that you may receive in the mail after your visit " with us. Thank you!             Your Updated Medication List - Protect others around you: Learn how to safely use, store and throw away your medicines at www.disposemymeds.org.          This list is accurate as of 8/7/18 11:43 AM.  Always use your most recent med list.                   Brand Name Dispense Instructions for use Diagnosis    diclofenac 75 MG EC tablet    VOLTAREN    30 tablet    Take 1 tablet (75 mg) by mouth 2 times daily as needed for moderate pain        HYDROmorphone 2 MG tablet    DILAUDID    10 tablet    Take 1 tablet (2 mg) by mouth every 6 hours as needed for pain        oxyCODONE-acetaminophen 5-325 MG per tablet    PERCOCET     Take 1 tablet by mouth every 6 hours as needed for pain

## 2018-08-07 NOTE — PROGRESS NOTES
SUBJECTIVE:   Ozzie Mathew is a 53 year old male who presents to clinic today for the following health issues:      ED/UC Followup:    Facility:  [facility]  Date of visit: 07/29/2018  Reason for visit: rib fractures and back injury  Current Status: still having pain     Patient states still with right lateral chest pain where therib fractures are.  Also pain on right lumbar paravertebral area with spasms.  Denies radaiating pain to legs.  Patient denies dyspnea, palpitation or hemoptysis.  Patient states he has rarely used the opiate analgesic since the last few days.    Problem list and histories reviewed & adjusted, as indicated.  Additional history: as documented    Patient Active Problem List   Diagnosis     Morbid obesity (H)     Past Surgical History:   Procedure Laterality Date     ORTHOPEDIC SURGERY      pin in leg       Social History   Substance Use Topics     Smoking status: Current Every Day Smoker     Types: Cigarettes     Start date: 8/7/2018     Smokeless tobacco: Never Used     Alcohol use Yes      Comment: occasional      Family History   Problem Relation Age of Onset     Coronary Artery Disease Mother          Current Outpatient Prescriptions   Medication Sig Dispense Refill     cyclobenzaprine (FLEXERIL) 10 MG tablet Take 0.5-1 tablets (5-10 mg) by mouth 3 times daily as needed for muscle spasms 30 tablet 0     diclofenac (VOLTAREN) 75 MG EC tablet Take 1 tablet (75 mg) by mouth 2 times daily as needed for moderate pain 30 tablet 0     oxyCODONE-acetaminophen (PERCOCET) 5-325 MG per tablet Take 1 tablet by mouth every 6 hours as needed for pain       HYDROmorphone (DILAUDID) 2 MG tablet Take 1 tablet (2 mg) by mouth every 6 hours as needed for pain 10 tablet 0     No Known Allergies    Reviewed and updated as needed this visit by clinical staff  Tobacco  Allergies  Meds  Problems  Med Hx  Surg Hx  Fam Hx  Soc Hx        Reviewed and updated as needed this visit by Provider  Allergies  Meds   "Problems         ROS:  C: NEGATIVE for fever, chills, change in weight  I: NEGATIVE for worrisome rashes, moles or lesions  E: NEGATIVE for vision changes or irritation  R: NEGATIVE for significant cough or SOB  CV: NEGATIVE for chest pain, palpitations or peripheral edema  GI: NEGATIVE for nausea, abdominal pain, heartburn, or change in bowel habits  : NEGATIVE for frequency, dysuria, or hematuria  M: NEGATIVE for significant arthralgias or myalgia  N: NEGATIVE for weakness, dizziness or paresthesias  E: NEGATIVE for temperature intolerance, skin/hair changes  H: NEGATIVE for bleeding problems    OBJECTIVE:                                                    /90  Pulse 86  Temp 98.7  F (37.1  C) (Tympanic)  Resp 14  Ht 5' 9\" (1.753 m)  Wt 317 lb (143.8 kg)  SpO2 96%  BMI 46.81 kg/m2  Body mass index is 46.81 kg/(m^2).  GENERAL: morbidly obese, alert and no distress  NECK: no tenderness, no adenopathy,  Thyroid not enlarged  RESP: lungs clear to auscultation - no rales, no rhonchi, no wheezes  CV: regular rates and rhythm, normal S1 S2, no S3 or S4 and no murmur, no click or rub  MS: extremities- no gross deformities noted, no edema  SKIN: no suspicious lesions, no rashes  NEURO: strength and tone- normal, sensory exam- grossly normal, mentation- intact, speech- normal, reflexes- symmetric  CHEST: equal chest expansion, mild TTP of the T8 and T9 right lateral rib area, no deformity    Diagnostic test results:  Diagnostic Test Results:  Results for orders placed or performed during the hospital encounter of 07/29/18   CT Chest/Abdomen/Pelvis w Contrast    Narrative    CT CHEST, ABDOMEN, PELVIS WITH CONTRAST   7/29/2018 3:27 PM     HISTORY: Fall off ladder.    TECHNIQUE: 100 mL Isovue 370 IV were administered. After contrast  administration, volumetric helical sections were acquired from the  thoracic inlet to the ischial tuberosities. Coronal images were also  reconstructed. Radiation dose for this " scan was reduced using  automated exposure control, adjustment of the mA and/or kV according  to patient size, or iterative reconstruction technique.    COMPARISON: None.    FINDINGS:    Chest: No pneumothorax. Mild atelectasis is noted at both lung bases.  Small calcified granuloma in the right middle lobe. Trace amount of  pleural fluid is noted on the right. No pericardial or left pleural  effusion. Atherosclerotic calcification of the thoracic aorta and  coronary arteries. No acute mediastinal abnormality is identified.  Calcified right hilar lymph nodes are noted. There are mildly  displaced fractures of the right eighth and ninth ribs  posterolaterally. There are also nondisplaced fractures of the right  sixth and seventh ribs laterally. The right seventh rib also likely  contains nondisplaced fractures anteriorly as well as posteriorly and  medially.    Abdomen and Pelvis: The liver, gallbladder, spleen, adrenal glands,  pancreas, and kidneys are unremarkable. No evidence for solid organ  injury. No bowel obstruction. No free fluid in the abdomen or pelvis.  No intra-abdominal fluid collection to suggest hematoma. There is a  small fat-containing paraumbilical hernia. There is a nondisplaced  fracture of the right L2 transverse process. Degenerative changes are  noted in both hips and in the thoracolumbar spine.      Impression    IMPRESSION:   1. There are displaced fractures of the right eighth and ninth ribs  posterolaterally, as well as nondisplaced fractures of the right sixth  and seventh ribs laterally.   2. There is a nondisplaced fracture right L2 transverse process.  3. Trace right pleural effusion.  4. Small fat-containing paraumbilical hernia.    NABOR MONTERO MD   CBC with platelets differential   Result Value Ref Range    WBC 8.3 4.0 - 11.0 10e9/L    RBC Count 5.16 4.4 - 5.9 10e12/L    Hemoglobin 15.5 13.3 - 17.7 g/dL    Hematocrit 46.4 40.0 - 53.0 %    MCV 90 78 - 100 fl    MCH 30.0 26.5 -  33.0 pg    MCHC 33.4 31.5 - 36.5 g/dL    RDW 13.2 10.0 - 15.0 %    Platelet Count 338 150 - 450 10e9/L    Diff Method Automated Method     % Neutrophils 69.5 %    % Lymphocytes 18.9 %    % Monocytes 8.0 %    % Eosinophils 2.8 %    % Basophils 0.4 %    % Immature Granulocytes 0.4 %    Nucleated RBCs 0 0 /100    Absolute Neutrophil 5.8 1.6 - 8.3 10e9/L    Absolute Lymphocytes 1.6 0.8 - 5.3 10e9/L    Absolute Monocytes 0.7 0.0 - 1.3 10e9/L    Absolute Eosinophils 0.2 0.0 - 0.7 10e9/L    Absolute Basophils 0.0 0.0 - 0.2 10e9/L    Abs Immature Granulocytes 0.0 0 - 0.4 10e9/L    Absolute Nucleated RBC 0.0    Comprehensive metabolic panel   Result Value Ref Range    Sodium 137 133 - 144 mmol/L    Potassium 4.8 3.4 - 5.3 mmol/L    Chloride 104 94 - 109 mmol/L    Carbon Dioxide 26 20 - 32 mmol/L    Anion Gap 7 3 - 14 mmol/L    Glucose 132 (H) 70 - 99 mg/dL    Urea Nitrogen 15 7 - 30 mg/dL    Creatinine 0.72 0.66 - 1.25 mg/dL    GFR Estimate >90 >60 mL/min/1.7m2    GFR Estimate If Black >90 >60 mL/min/1.7m2    Calcium 9.0 8.5 - 10.1 mg/dL    Bilirubin Total 0.8 0.2 - 1.3 mg/dL    Albumin 3.4 3.4 - 5.0 g/dL    Protein Total 8.2 6.8 - 8.8 g/dL    Alkaline Phosphatase 111 40 - 150 U/L    ALT 25 0 - 70 U/L    AST 35 0 - 45 U/L   Lipase   Result Value Ref Range    Lipase 101 73 - 393 U/L        ASSESSMENT/PLAN:                                                        ICD-10-CM    1. Closed fracture of multiple ribs with routine healing, unspecified laterality, subsequent encounter S22.49XD cyclobenzaprine (FLEXERIL) 10 MG tablet  No distress.  Discussed course of healing for fractures.  Discussed analgesia. Patient prefers to defer further opiate analgesic for now.  Activity modifications.  Deep breathing exercises and incentive spirometer use discussed.  Return precautions discussed and given to patient.     2. Closed fracture of transverse process of lumbar vertebra with routine healing, subsequent encounter S33.747S  cyclobenzaprine (FLEXERIL) 10 MG tablet  Discussed fracture healing course.  Offered muscle relaxant for symptom relief, specially during sleep.  Patient concurred.  Return precautions discussed and given to patient.     3. Morbid obesity (H) E66.01 May contribute to chest restriction from the fracture pain.  Discussed risks of obesity: heart disease, stroke, end-organ damage,  DM, decreased quality of life  Counselled on diet, exercise and weight loss regimen     4. Screening for malignant neoplasm of colon Z12.11 Fecal colorectal cancer screen (FIT)   5. Need for vaccination Z23 TDAP VACCINE (ADACEL) [68053.002]  1st  Administration  [05942]   6.       Elevated blood pressure   See plan below       Follow up with Provider - 3-4 weeks   Patient Instructions   May continue to take Percocet as needed for severe pain.  May take flexeril 5-10 mg orally 8 hours apart only as needed for muscle spasms.    Ambulate as tolerated.  Avoid heavy lifting until rib fractures have completely healed.  Healing usually takes 6 weeks to complete.    Blood pressure today slightly elevated.  To verify if real elevation, schedule provider visit in 3-4 weeks  If still above 130/80 will need medical treatment.  Low salt, low fat diet.     Turn in your FIT test at your soonest convenience to screen for colon cancer.  If this is positive, colonoscopy will be advised to determine if you do have colon cancer.      Rib Fracture    You broke one or more ribs. This is called a rib fracture. Rib fractures do not require a cast like other bones. They will heal by themselves in about 4 to 6 weeks. The first 3 to 4 weeks will be the most painful because deep breathing, coughing, or changing position from sitting to lying down, may cause the broken ends to move slightly.  Home care    Rest. You should not be doing any heavy lifting or strenuous exertion until the pain goes away.    It hurts to breathe when you have a broken rib. This puts you at risk  of getting pneumonia from poor airflow through your lungs. To prevent this:  ? Take several very deep breaths once an hour while you're awake. Exhale through pursed lips as if you are blowing up a balloon. If possible, actually blow up a balloon or a rubber glove. This exercise builds up pressure inside the lung and prevents collapse of the small air sacs of the lung. This exercise may cause some pain at the site of injury, which is normal.  ? You may have gotten a breathing exercise device called an incentive spirometer. Use it at least 4 times a day, or as directed.    Apply an ice pack over the injured area for 15 to 20 minutes every 1 to 2 hours. You should do this for the first 24 to 48 hours. You can make an ice pack by filling a plastic bag that seals at the top with ice cubes and then wrapping it with a thin towel. Continue with ice packs as needed for the relief of pain and swelling.    You may use over-the-counter pain medicine to control pain, unless another pain medicine was prescribed. If you have chronic liver or kidney disease or ever had a stomach ulcer or GI bleeding, talk with your healthcare provider before using these medicines.    If your pain is not controlled, contact your healthcare provider. Sometimes a stronger pain medicine may be needed. A nerve block can be done in case of severe pain. It will numb the nerve between the ribs.  Follow-up care  Follow up with your healthcare provider, or as advised. Rarely, a broken rib will cause complications within the first few days that may not be evident during your initial exam. This can include collapsed lung, bleeding around the lung or into the abdomen, or pneumonia. Therefore, watch for the signs below.  If X-rays were taken, you will be told of any new findings that may affect your care.  Call 911  Call 911 if you have:    Dizziness, weakness or fainting    Shortness of breath with or without chest discomfort    New or worsening abdominal  pain    Discomfort in other areas of your upper body such as your shoulders, jaw, neck, or arms  When to seek medical advice  Call your healthcare provider right away if any of these occur:    Increasing chest pain with breathing    Fever of 100.4 F (38 C) or higher, or as directed by your healthcare provider    Congested cough  Date Last Reviewed: 12/3/2015    0527-5414 [employer]. [address] [place] [address]. All rights reserved. This information is not intended as a substitute for professional medical care. Always follow your healthcare professional's instructions.            Manjinder Hernandez MD  [facility]

## 2018-08-07 NOTE — PATIENT INSTRUCTIONS
May continue to take Percocet as needed for severe pain.  May take flexeril 5-10 mg orally 8 hours apart only as needed for muscle spasms.    Ambulate as tolerated.  Avoid heavy lifting until rib fractures have completely healed.  Healing usually takes 6 weeks to complete.    Blood pressure today slightly elevated.  To verify if real elevation, schedule provider visit in 3-4 weeks  If still above 130/80 will need medical treatment.  Low salt, low fat diet.     Turn in your FIT test at your soonest convenience to screen for colon cancer.  If this is positive, colonoscopy will be advised to determine if you do have colon cancer.      Rib Fracture    You broke one or more ribs. This is called a rib fracture. Rib fractures do not require a cast like other bones. They will heal by themselves in about 4 to 6 weeks. The first 3 to 4 weeks will be the most painful because deep breathing, coughing, or changing position from sitting to lying down, may cause the broken ends to move slightly.  Home care    Rest. You should not be doing any heavy lifting or strenuous exertion until the pain goes away.    It hurts to breathe when you have a broken rib. This puts you at risk of getting pneumonia from poor airflow through your lungs. To prevent this:  ? Take several very deep breaths once an hour while you're awake. Exhale through pursed lips as if you are blowing up a balloon. If possible, actually blow up a balloon or a rubber glove. This exercise builds up pressure inside the lung and prevents collapse of the small air sacs of the lung. This exercise may cause some pain at the site of injury, which is normal.  ? You may have gotten a breathing exercise device called an incentive spirometer. Use it at least 4 times a day, or as directed.    Apply an ice pack over the injured area for 15 to 20 minutes every 1 to 2 hours. You should do this for the first 24 to 48 hours. You can make an ice pack by filling a plastic bag that seals  at the top with ice cubes and then wrapping it with a thin towel. Continue with ice packs as needed for the relief of pain and swelling.    You may use over-the-counter pain medicine to control pain, unless another pain medicine was prescribed. If you have chronic liver or kidney disease or ever had a stomach ulcer or GI bleeding, talk with your healthcare provider before using these medicines.    If your pain is not controlled, contact your healthcare provider. Sometimes a stronger pain medicine may be needed. A nerve block can be done in case of severe pain. It will numb the nerve between the ribs.  Follow-up care  Follow up with your healthcare provider, or as advised. Rarely, a broken rib will cause complications within the first few days that may not be evident during your initial exam. This can include collapsed lung, bleeding around the lung or into the abdomen, or pneumonia. Therefore, watch for the signs below.  If X-rays were taken, you will be told of any new findings that may affect your care.  Call 911  Call 911 if you have:    Dizziness, weakness or fainting    Shortness of breath with or without chest discomfort    New or worsening abdominal pain    Discomfort in other areas of your upper body such as your shoulders, jaw, neck, or arms  When to seek medical advice  Call your healthcare provider right away if any of these occur:    Increasing chest pain with breathing    Fever of 100.4 F (38 C) or higher, or as directed by your healthcare provider    Congested cough  Date Last Reviewed: 12/3/2015    6255-2840 [employer]. [address] [place] [address], [place] [address]. All rights reserved. This information is not intended as a substitute for professional medical care. Always follow your healthcare professional's instructions.

## 2018-08-07 NOTE — NURSING NOTE
"Initial BP (!) 150/98 (BP Location: Right arm, Patient Position: Chair, Cuff Size: Adult Large)  Pulse 86  Temp 98.7  F (37.1  C) (Tympanic)  Ht 5' 9\" (1.753 m)  Wt 317 lb (143.8 kg)  SpO2 96%  BMI 46.81 kg/m2 Estimated body mass index is 46.81 kg/(m^2) as calculated from the following:    Height as of this encounter: 5' 9\" (1.753 m).    Weight as of this encounter: 317 lb (143.8 kg). .    Zulay Nieves    "

## 2019-06-25 ENCOUNTER — TELEPHONE (OUTPATIENT)
Dept: FAMILY MEDICINE | Facility: CLINIC | Age: 54
End: 2019-06-25

## 2019-06-25 NOTE — LETTER
June 25, 2019      Ozzie Quincy  [address]  [place] [address]          At this time you are due for a Colon Cancer Screening. Here is some information regarding this testing.     Recommended every 5-10 years, depending on your history, in order to prevent and detect colon cancer at its earliest stages.  Colon cancer is now the second leading cause of death in the [place] for both men and women and there are over 130,000 new cases and 50,000 deaths per year from colon cancer.  Colonoscopies can prevent 90-95% of these deaths.  Problem lesions can be removed before they ever become cancer. This test is not only looking for cancer, but also getting rid of precancerious lesions. You are usually given some sedation which makes the test very comfortable for most people.      If you do not wish to do a colonoscopy or cannot afford to do one, at this time, there is another option. It is called a FIT test or Fecal Immunochemical Occult Blood Test (take home stool sample kit).  It does not replace the colonoscopy for colorectal cancer screening, but it can detect hidden bleeding in the lower colon.  It does need to be repeated every year and if a positive result is obtained, you would be referred for a colonoscopy. The FIT test is really easy to do and does not require any  diet or medication restrictions and involves only one collection sample.      If you have completed either one of these tests or had a flexible sigmoidoscopy in the past five years at another facility, please have the records sent to our clinic so that we can best coordinate your care and update your chart.  Please call us if you have questions or would like arrange either to do a colonoscopy or obtain the necessary test kit for the Fecal Occult Test.  Sincerely,        Manjinder Hernandez MD

## 2019-06-25 NOTE — TELEPHONE ENCOUNTER
Panel Management Review    Composite cancer screening  Chart review shows that this patient is due/due soon for the following Colonoscopy  Summary:    Patient is due/failing the following:   COLONOSCOPY    Action needed:   Patient needs referral/order: colonoscopy    Type of outreach:    Sent letter.    Questions for provider review:    None                                                                                                                                    Yvonne Cameron CMA